# Patient Record
Sex: FEMALE | Race: ASIAN | NOT HISPANIC OR LATINO | Employment: FULL TIME | URBAN - METROPOLITAN AREA
[De-identification: names, ages, dates, MRNs, and addresses within clinical notes are randomized per-mention and may not be internally consistent; named-entity substitution may affect disease eponyms.]

---

## 2017-08-31 ENCOUNTER — OFFICE VISIT (OUTPATIENT)
Dept: RETAIL CLINIC | Facility: CLINIC | Age: 37
End: 2017-08-31

## 2017-08-31 VITALS
HEART RATE: 83 BPM | RESPIRATION RATE: 18 BRPM | TEMPERATURE: 98.7 F | DIASTOLIC BLOOD PRESSURE: 82 MMHG | OXYGEN SATURATION: 98 % | SYSTOLIC BLOOD PRESSURE: 102 MMHG

## 2017-08-31 DIAGNOSIS — N30.91 CYSTITIS WITH HEMATURIA: Primary | ICD-10-CM

## 2017-08-31 LAB
BILIRUB BLD-MCNC: NEGATIVE MG/DL
CLARITY, POC: ABNORMAL
COLOR UR: YELLOW
GLUCOSE UR STRIP-MCNC: NEGATIVE MG/DL
KETONES UR QL: NEGATIVE
LEUKOCYTE EST, POC: ABNORMAL
NITRITE UR-MCNC: POSITIVE MG/ML
PH UR: 6 [PH] (ref 5–8)
PROT UR STRIP-MCNC: ABNORMAL MG/DL
RBC # UR STRIP: ABNORMAL /UL
SP GR UR: 1.03 (ref 1–1.03)
UROBILINOGEN UR QL: NORMAL

## 2017-08-31 PROCEDURE — 81003 URINALYSIS AUTO W/O SCOPE: CPT | Performed by: NURSE PRACTITIONER

## 2017-08-31 PROCEDURE — 99203 OFFICE O/P NEW LOW 30 MIN: CPT | Performed by: NURSE PRACTITIONER

## 2017-08-31 RX ORDER — SULFAMETHOXAZOLE AND TRIMETHOPRIM 800; 160 MG/1; MG/1
1 TABLET ORAL 2 TIMES DAILY
Qty: 14 TABLET | Refills: 0 | Status: SHIPPED | OUTPATIENT
Start: 2017-08-31 | End: 2017-09-07

## 2019-06-21 ENCOUNTER — OFFICE VISIT (OUTPATIENT)
Dept: RETAIL CLINIC | Facility: CLINIC | Age: 39
End: 2019-06-21

## 2019-06-21 VITALS
DIASTOLIC BLOOD PRESSURE: 72 MMHG | OXYGEN SATURATION: 98 % | RESPIRATION RATE: 18 BRPM | TEMPERATURE: 98.2 F | HEART RATE: 65 BPM | SYSTOLIC BLOOD PRESSURE: 100 MMHG

## 2019-06-21 DIAGNOSIS — R30.0 DYSURIA: Primary | ICD-10-CM

## 2019-06-21 LAB
BILIRUB BLD-MCNC: NEGATIVE MG/DL
CLARITY, POC: CLEAR
COLOR UR: YELLOW
GLUCOSE UR STRIP-MCNC: NEGATIVE MG/DL
KETONES UR QL: NEGATIVE
LEUKOCYTE EST, POC: NEGATIVE
NITRITE UR-MCNC: NEGATIVE MG/ML
PH UR: 6 [PH] (ref 5–8)
PROT UR STRIP-MCNC: NEGATIVE MG/DL
RBC # UR STRIP: NEGATIVE /UL
SP GR UR: 1.02 (ref 1–1.03)
UROBILINOGEN UR QL: NORMAL

## 2019-06-21 PROCEDURE — 81003 URINALYSIS AUTO W/O SCOPE: CPT | Performed by: NURSE PRACTITIONER

## 2019-06-21 PROCEDURE — 99213 OFFICE O/P EST LOW 20 MIN: CPT | Performed by: NURSE PRACTITIONER

## 2019-06-21 NOTE — PROGRESS NOTES
Subjective   Patient ID: Ceci Nolasco is a 39 y.o. female presents with   Chief Complaint   Patient presents with   • Urinary Tract Infection       Urinary Tract Infection    This is a new problem. The current episode started in the past 7 days. The problem has been gradually improving. The quality of the pain is described as aching. The pain is mild. There has been no fever. She is sexually active. There is a history of pyelonephritis. Associated symptoms include frequency and urgency. Pertinent negatives include no flank pain or hematuria. Treatments tried: cranberry and probiotics. Her past medical history is significant for recurrent UTIs. There is no history of kidney stones or a single kidney.       No Known Allergies    The following portions of the patient's history were reviewed and updated as appropriate: allergies, current medications, past family history, past medical history, past social history, past surgical history and problem list.      Review of Systems   Constitutional: Negative.    Cardiovascular: Negative.    Gastrointestinal: Negative.    Genitourinary: Positive for dysuria, frequency and urgency. Negative for decreased urine volume, difficulty urinating, dyspareunia, enuresis, flank pain, genital sores, hematuria, menstrual problem, pelvic pain, vaginal bleeding, vaginal discharge and vaginal pain.       Objective     Vitals:    06/21/19 1238   BP: 100/72   Pulse: 65   Resp: 18   Temp: 98.2 °F (36.8 °C)   SpO2: 98%         Physical Exam   Constitutional: She is oriented to person, place, and time. She appears well-developed and well-nourished. She does not appear ill. No distress.   HENT:   Head: Normocephalic.   Right Ear: Hearing and external ear normal.   Left Ear: Hearing and external ear normal.   Eyes: Conjunctivae are normal.   Sclera white.   Neck: No tracheal deviation present.   Cardiovascular: Normal rate, regular rhythm, S1 normal, S2 normal and normal heart sounds.    Pulmonary/Chest: Effort normal and breath sounds normal. No accessory muscle usage. No respiratory distress.   Abdominal: Soft. Bowel sounds are normal. She exhibits no distension. There is no hepatosplenomegaly. There is tenderness in the right lower quadrant and left lower quadrant. There is no rigidity, no rebound, no guarding and no CVA tenderness. No hernia.   Lymphadenopathy:     She has no cervical adenopathy.   Neurological: She is alert and oriented to person, place, and time.   Skin: Skin is warm and dry.   Vitals reviewed.    Ref Range & Units 12:53    Color Yellow, Straw, Dark Yellow, Radha Yellow    Clarity, UA Clear Clear    Specific Gravity  1.005 - 1.030 1.020    pH, Urine 5.0 - 8.0 6.0    Leukocytes Negative Negative    Nitrite, UA Negative Negative    Protein, POC Negative mg/dL Negative    Glucose, UA Negative, 1000 mg/dL (3+) mg/dL Negative    Ketones, UA Negative Negative    Urobilinogen, UA Normal Normal    Bilirubin Negative Negative    Blood, UA Negative Negative    Resulting Agency  [facility]         Specimen Collected: 06/21/19 12:53 Last Resulted: 06/21/19 12:53            Ceci was seen today for urinary tract infection.    Diagnoses and all orders for this visit:    Dysuria  -     POC Urinalysis Dipstick, Automated        Patient understands possible side effects of all medications ordered. Follow-up with Primary Care Physician in 48-72 hours if these symptoms worsen or fail to improve as anticipated. Patient verbalizes understanding.  Dysuria  Dysuria is pain or discomfort while urinating. The pain or discomfort may be felt in the tube that carries urine out of the bladder (urethra) or in the surrounding tissue of the genitals. The pain may also be felt in the groin area, lower abdomen, and lower back. You may have to urinate frequently or have the sudden feeling that you have to urinate (urgency). Dysuria can affect both men and women, but is more common in  women.  Dysuria can be caused by many different things, including:  · Urinary tract infection in women.  · Infection of the kidney or bladder.  · Kidney stones or bladder stones.  · Certain sexually transmitted infections (STIs), such as chlamydia.  · Dehydration.  · Inflammation of the vagina.  · Use of certain medicines.  · Use of certain soaps or scented products that cause irritation.    Follow these instructions at home:  Watch your dysuria for any changes. The following actions may help to reduce any discomfort you are feeling:  · Drink enough fluid to keep your urine clear or pale yellow.  · Empty your bladder often. Avoid holding urine for long periods of time.  · After a bowel movement or urination, women should cleanse from front to back, using each tissue only once.  · Empty your bladder after sexual intercourse.  · Take medicines only as directed by your health care provider.  · If you were prescribed an antibiotic medicine, finish it all even if you start to feel better.  · Avoid caffeine, tea, and alcohol. They can irritate the bladder and make dysuria worse. In men, alcohol may irritate the prostate.  · Keep all follow-up visits as directed by your health care provider. This is important.  · If you had any tests done to find the cause of dysuria, it is your responsibility to obtain your test results. Ask the lab or department performing the test when and how you will get your results. Talk with your health care provider if you have any questions about your results.    Contact a health care provider if:  · You develop pain in your back or sides.  · You have a fever.  · You have nausea or vomiting.  · You have blood in your urine.  · You are not urinating as often as you usually do.  Get help right away if:  · You pain is severe and not relieved with medicines.  · You are unable to hold down any fluids.  · You or someone else notices a change in your mental function.  · You have a rapid heartbeat at  rest.  · You have shaking or chills.  · You feel extremely weak.  This information is not intended to replace advice given to you by your health care provider. Make sure you discuss any questions you have with your health care provider.  Document Released: 09/15/2005 Document Revised: 05/25/2017 Document Reviewed: 08/13/2015  [employer] Interactive Patient Education © 2018 [employer]

## 2019-06-21 NOTE — PATIENT INSTRUCTIONS
Dysuria  Dysuria is pain or discomfort while urinating. The pain or discomfort may be felt in the tube that carries urine out of the bladder (urethra) or in the surrounding tissue of the genitals. The pain may also be felt in the groin area, lower abdomen, and lower back. You may have to urinate frequently or have the sudden feeling that you have to urinate (urgency). Dysuria can affect both men and women, but is more common in women.  Dysuria can be caused by many different things, including:  · Urinary tract infection in women.  · Infection of the kidney or bladder.  · Kidney stones or bladder stones.  · Certain sexually transmitted infections (STIs), such as chlamydia.  · Dehydration.  · Inflammation of the vagina.  · Use of certain medicines.  · Use of certain soaps or scented products that cause irritation.    Follow these instructions at home:  Watch your dysuria for any changes. The following actions may help to reduce any discomfort you are feeling:  · Drink enough fluid to keep your urine clear or pale yellow.  · Empty your bladder often. Avoid holding urine for long periods of time.  · After a bowel movement or urination, women should cleanse from front to back, using each tissue only once.  · Empty your bladder after sexual intercourse.  · Take medicines only as directed by your health care provider.  · If you were prescribed an antibiotic medicine, finish it all even if you start to feel better.  · Avoid caffeine, tea, and alcohol. They can irritate the bladder and make dysuria worse. In men, alcohol may irritate the prostate.  · Keep all follow-up visits as directed by your health care provider. This is important.  · If you had any tests done to find the cause of dysuria, it is your responsibility to obtain your test results. Ask the lab or department performing the test when and how you will get your results. Talk with your health care provider if you have any questions about your results.    Contact a  health care provider if:  · You develop pain in your back or sides.  · You have a fever.  · You have nausea or vomiting.  · You have blood in your urine.  · You are not urinating as often as you usually do.  Get help right away if:  · You pain is severe and not relieved with medicines.  · You are unable to hold down any fluids.  · You or someone else notices a change in your mental function.  · You have a rapid heartbeat at rest.  · You have shaking or chills.  · You feel extremely weak.  This information is not intended to replace advice given to you by your health care provider. Make sure you discuss any questions you have with your health care provider.  Document Released: 09/15/2005 Document Revised: 05/25/2017 Document Reviewed: 08/13/2015  [employer] Interactive Patient Education © 2018 [employer]

## 2022-02-24 ENCOUNTER — APPOINTMENT (OUTPATIENT)
Dept: WOMENS IMAGING | Facility: HOSPITAL | Age: 42
End: 2022-02-24

## 2022-02-24 PROCEDURE — 77067 SCR MAMMO BI INCL CAD: CPT | Performed by: RADIOLOGY

## 2022-02-24 PROCEDURE — 77063 BREAST TOMOSYNTHESIS BI: CPT | Performed by: RADIOLOGY

## 2022-03-21 ENCOUNTER — APPOINTMENT (OUTPATIENT)
Dept: WOMENS IMAGING | Facility: HOSPITAL | Age: 42
End: 2022-03-21

## 2022-03-21 PROCEDURE — 76642 ULTRASOUND BREAST LIMITED: CPT | Performed by: RADIOLOGY

## 2022-03-21 PROCEDURE — 77061 BREAST TOMOSYNTHESIS UNI: CPT | Performed by: RADIOLOGY

## 2022-03-21 PROCEDURE — G0279 TOMOSYNTHESIS, MAMMO: HCPCS | Performed by: RADIOLOGY

## 2022-03-21 PROCEDURE — 77065 DX MAMMO INCL CAD UNI: CPT | Performed by: RADIOLOGY

## 2022-09-23 ENCOUNTER — APPOINTMENT (OUTPATIENT)
Dept: WOMENS IMAGING | Facility: HOSPITAL | Age: 42
End: 2022-09-23

## 2022-09-23 PROCEDURE — 77061 BREAST TOMOSYNTHESIS UNI: CPT | Performed by: RADIOLOGY

## 2022-09-23 PROCEDURE — 76642 ULTRASOUND BREAST LIMITED: CPT | Performed by: RADIOLOGY

## 2022-09-23 PROCEDURE — 77065 DX MAMMO INCL CAD UNI: CPT | Performed by: RADIOLOGY

## 2022-09-23 PROCEDURE — G0279 TOMOSYNTHESIS, MAMMO: HCPCS | Performed by: RADIOLOGY

## 2023-03-28 ENCOUNTER — APPOINTMENT (OUTPATIENT)
Dept: WOMENS IMAGING | Facility: HOSPITAL | Age: 43
End: 2023-03-28
Payer: COMMERCIAL

## 2023-03-28 PROCEDURE — 77066 DX MAMMO INCL CAD BI: CPT | Performed by: RADIOLOGY

## 2023-03-28 PROCEDURE — 77062 BREAST TOMOSYNTHESIS BI: CPT | Performed by: RADIOLOGY

## 2023-03-28 PROCEDURE — 76642 ULTRASOUND BREAST LIMITED: CPT | Performed by: RADIOLOGY

## 2023-03-28 PROCEDURE — G0279 TOMOSYNTHESIS, MAMMO: HCPCS | Performed by: RADIOLOGY

## 2023-08-09 ENCOUNTER — PRE-ADMISSION TESTING (OUTPATIENT)
Dept: PREADMISSION TESTING | Facility: HOSPITAL | Age: 43
End: 2023-08-09
Payer: COMMERCIAL

## 2023-08-09 VITALS
DIASTOLIC BLOOD PRESSURE: 77 MMHG | HEIGHT: 60 IN | RESPIRATION RATE: 16 BRPM | BODY MASS INDEX: 28.76 KG/M2 | HEART RATE: 58 BPM | OXYGEN SATURATION: 100 % | SYSTOLIC BLOOD PRESSURE: 112 MMHG | TEMPERATURE: 98.3 F | WEIGHT: 146.5 LBS

## 2023-08-09 LAB
ANION GAP SERPL CALCULATED.3IONS-SCNC: 9 MMOL/L (ref 5–15)
BUN SERPL-MCNC: 15 MG/DL (ref 6–20)
BUN/CREAT SERPL: 20.8 (ref 7–25)
CALCIUM SPEC-SCNC: 10.8 MG/DL (ref 8.6–10.5)
CHLORIDE SERPL-SCNC: 104 MMOL/L (ref 98–107)
CO2 SERPL-SCNC: 24 MMOL/L (ref 22–29)
CREAT SERPL-MCNC: 0.72 MG/DL (ref 0.57–1)
DEPRECATED RDW RBC AUTO: 37.9 FL (ref 37–54)
EGFRCR SERPLBLD CKD-EPI 2021: 107.2 ML/MIN/1.73
ERYTHROCYTE [DISTWIDTH] IN BLOOD BY AUTOMATED COUNT: 12.5 % (ref 12.3–15.4)
GLUCOSE SERPL-MCNC: 97 MG/DL (ref 65–99)
HCT VFR BLD AUTO: 37.7 % (ref 34–46.6)
HGB BLD-MCNC: 12.1 G/DL (ref 12–15.9)
MCH RBC QN AUTO: 26.9 PG (ref 26.6–33)
MCHC RBC AUTO-ENTMCNC: 32.1 G/DL (ref 31.5–35.7)
MCV RBC AUTO: 83.8 FL (ref 79–97)
PLATELET # BLD AUTO: 260 10*3/MM3 (ref 140–450)
PMV BLD AUTO: 10 FL (ref 6–12)
POTASSIUM SERPL-SCNC: 4.5 MMOL/L (ref 3.5–5.2)
QT INTERVAL: 391 MS
RBC # BLD AUTO: 4.5 10*6/MM3 (ref 3.77–5.28)
SODIUM SERPL-SCNC: 137 MMOL/L (ref 136–145)
WBC NRBC COR # BLD: 5.18 10*3/MM3 (ref 3.4–10.8)

## 2023-08-09 PROCEDURE — 80048 BASIC METABOLIC PNL TOTAL CA: CPT

## 2023-08-09 PROCEDURE — 93010 ELECTROCARDIOGRAM REPORT: CPT | Performed by: INTERNAL MEDICINE

## 2023-08-09 PROCEDURE — 93005 ELECTROCARDIOGRAM TRACING: CPT

## 2023-08-09 PROCEDURE — 36415 COLL VENOUS BLD VENIPUNCTURE: CPT

## 2023-08-09 PROCEDURE — 85027 COMPLETE CBC AUTOMATED: CPT

## 2023-08-09 RX ORDER — SPIRONOLACTONE 50 MG/1
50 TABLET, FILM COATED ORAL DAILY
COMMUNITY

## 2023-08-09 NOTE — DISCHARGE INSTRUCTIONS
Take the following medications the morning of surgery:    NONE    ARRIVE AT 0530.    If you are on prescription narcotic pain medication to control your pain you may also take that medication the morning of surgery.    General Instructions:  Do not eat solid food after midnight the night before surgery.  You may drink clear liquids day of surgery but must stop at least one hour before your hospital arrival time.  It is beneficial for you to have a clear drink that contains carbohydrates the day of surgery.  We suggest a 12 to 20 ounce bottle of Gatorade or Powerade for non-diabetic patients or a 12 to 20 ounce bottle of G2 or Powerade Zero for diabetic patients. (Pediatric patients, are not advised to drink a 12 to 20 ounce carbohydrate drink)    Clear liquids are liquids you can see through.  Nothing red in color.     Plain water                               Sports drinks  Sodas                                   Gelatin (Jell-O)  Fruit juices without pulp such as white grape juice and apple juice  Popsicles that contain no fruit or yogurt  Tea or coffee (no cream or milk added)  Gatorade / Powerade  G2 / Powerade Zero    Infants may have breast milk up to four hours before surgery.  Infants drinking formula may drink formula up to six hours before surgery.   Patients who avoid smoking, chewing tobacco and alcohol for 4 weeks prior to surgery have a reduced risk of post-operative complications.  Quit smoking as many days before surgery as you can.  Do not smoke, use chewing tobacco or drink alcohol the day of surgery.   If applicable bring your C-PAP/ BI-PAP machine in with you to preop day of surgery.  Bring any papers given to you in the doctor's office.  Wear clean comfortable clothes.  Do not wear contact lenses, false eyelashes or make-up.  Bring a case for your glasses.   Bring crutches or walker if applicable.  Remove all piercings.  Leave jewelry and any other valuables at home.  Hair extensions with metal  clips must be removed prior to surgery.  The Pre-Admission Testing nurse will instruct you to bring medications if unable to obtain an accurate list in Pre-Admission Testing.        If you were given a blood bank ID arm band remember to bring it with you the day of surgery.    Preventing a Surgical Site Infection:  For 2 to 3 days before surgery, avoid shaving with a razor because the razor can irritate skin and make it easier to develop an infection.    Any areas of open skin can increase the risk of a post-operative wound infection by allowing bacteria to enter and travel throughout the body.  Notify your surgeon if you have any skin wounds / rashes even if it is not near the expected surgical site.  The area will need assessed to determine if surgery should be delayed until it is healed.  The night prior to surgery shower using a fresh bar of anti-bacterial soap (such as Dial) and clean washcloth.  Sleep in a clean bed with clean clothing.  Do not allow pets to sleep with you.  Shower on the morning of surgery using a fresh bar of anti-bacterial soap (such as Dial) and clean washcloth.  Dry with a clean towel and dress in clean clothing.  Ask your surgeon if you will be receiving antibiotics prior to surgery.  Make sure you, your family, and all healthcare providers clean their hands with soap and water or an alcohol based hand  before caring for you or your wound.    Day of surgery:  Your arrival time is approximately two hours before your scheduled surgery time.  Upon arrival, a Pre-op nurse and Anesthesiologist will review your health history, obtain vital signs, and answer questions you may have.  The only belongings needed at this time will be a list of your home medications and if applicable your C-PAP/BI-PAP machine.  A Pre-op nurse will start an IV and you may receive medication in preparation for surgery, including something to help you relax.     Please be aware that surgery does come with  discomfort.  We want to make every effort to control your discomfort so please discuss any uncontrolled symptoms with your nurse.   Your doctor will most likely have prescribed pain medications.      If you are going home after surgery you will receive individualized written care instructions before being discharged.  A responsible adult must drive you to and from the hospital on the day of your surgery and stay with you for 24 hours.  Discharge prescriptions can be filled by the hospital pharmacy during regular pharmacy hours.  If you are having surgery late in the day/evening your prescription may be e-prescribed to your pharmacy.  Please verify your pharmacy hours or chose a 24 hour pharmacy to avoid not having access to your prescription because your pharmacy has closed for the day.    If you are staying overnight following surgery, you will be transported to your hospital room following the recovery period.  [facility] has all private rooms.    If you have any questions please call Pre-Admission Testing at [phone].  Deductibles and co-payments are collected on the day of service. Please be prepared to pay the required co-pay, deductible or deposit on the day of service as defined by your plan.    Call your surgeon immediately if you experience any of the following symptoms:  Sore Throat  Shortness of Breath or difficulty breathing  Cough  Chills  Body soreness or muscle pain  Headache  Fever  New loss of taste or smell  Do not arrive for your surgery ill.  Your procedure will need to be rescheduled to another time.  You will need to call your physician before the day of surgery to avoid any unnecessary exposure to hospital staff as well as other patients.

## 2023-08-22 ENCOUNTER — HOSPITAL ENCOUNTER (INPATIENT)
Facility: HOSPITAL | Age: 43
LOS: 2 days | Discharge: HOME | DRG: 621 | End: 2023-08-24
Attending: SURGERY | Admitting: SURGERY

## 2023-08-22 ENCOUNTER — ANESTHESIA EVENT (OUTPATIENT)
Dept: PERIOP | Facility: HOSPITAL | Age: 43
DRG: 621 | End: 2023-08-22

## 2023-08-22 ENCOUNTER — ANESTHESIA (OUTPATIENT)
Dept: PERIOP | Facility: HOSPITAL | Age: 43
DRG: 621 | End: 2023-08-22

## 2023-08-22 DIAGNOSIS — G89.18 POSTOPERATIVE PAIN: ICD-10-CM

## 2023-08-22 DIAGNOSIS — E65 ABDOMINAL PANNICULUS: Primary | ICD-10-CM

## 2023-08-22 LAB
B-HCG UR QL: NEGATIVE
EXPIRATION DATE: NORMAL
INTERNAL NEGATIVE CONTROL: NEGATIVE
INTERNAL POSITIVE CONTROL: POSITIVE
Lab: NORMAL

## 2023-08-22 PROCEDURE — 25010000002 SUGAMMADEX 200 MG/2ML SOLUTION: Performed by: NURSE ANESTHETIST, CERTIFIED REGISTERED

## 2023-08-22 PROCEDURE — 25010000002 EPINEPHRINE PER 0.1 MG: Performed by: SURGERY

## 2023-08-22 PROCEDURE — 25010000002 CEFAZOLIN PER 500 MG: Performed by: SURGERY

## 2023-08-22 PROCEDURE — 0WQF0ZZ REPAIR ABDOMINAL WALL, OPEN APPROACH: ICD-10-PCS | Performed by: SURGERY

## 2023-08-22 PROCEDURE — 25010000002 CLINDAMYCIN PER 300 MG: Performed by: SURGERY

## 2023-08-22 PROCEDURE — 25010000002 FENTANYL CITRATE (PF) 50 MCG/ML SOLUTION: Performed by: NURSE ANESTHETIST, CERTIFIED REGISTERED

## 2023-08-22 PROCEDURE — 25010000002 METHYLPREDNISOLONE PER 125 MG: Performed by: SURGERY

## 2023-08-22 PROCEDURE — 25010000002 PROPOFOL 10 MG/ML EMULSION: Performed by: NURSE ANESTHETIST, CERTIFIED REGISTERED

## 2023-08-22 PROCEDURE — 25010000002 LABETALOL 5 MG/ML SOLUTION: Performed by: NURSE ANESTHETIST, CERTIFIED REGISTERED

## 2023-08-22 PROCEDURE — 25010000002 PHENYLEPHRINE 10 MG/ML SOLUTION: Performed by: NURSE ANESTHETIST, CERTIFIED REGISTERED

## 2023-08-22 PROCEDURE — 81025 URINE PREGNANCY TEST: CPT | Performed by: ANESTHESIOLOGY

## 2023-08-22 PROCEDURE — 25010000002 MIDAZOLAM PER 1 MG: Performed by: ANESTHESIOLOGY

## 2023-08-22 PROCEDURE — 25010000002 ONDANSETRON PER 1 MG: Performed by: NURSE ANESTHETIST, CERTIFIED REGISTERED

## 2023-08-22 PROCEDURE — 0J083ZZ ALTERATION OF ABDOMEN SUBCUTANEOUS TISSUE AND FASCIA, PERCUTANEOUS APPROACH: ICD-10-PCS | Performed by: SURGERY

## 2023-08-22 PROCEDURE — 25010000002 HYDROMORPHONE PER 4 MG: Performed by: NURSE ANESTHETIST, CERTIFIED REGISTERED

## 2023-08-22 PROCEDURE — 25010000002 ROPIVACAINE PER 1 MG: Performed by: SURGERY

## 2023-08-22 RX ORDER — SODIUM CHLORIDE 0.9 % (FLUSH) 0.9 %
3 SYRINGE (ML) INJECTION EVERY 12 HOURS SCHEDULED
Status: DISCONTINUED | OUTPATIENT
Start: 2023-08-22 | End: 2023-08-22 | Stop reason: HOSPADM

## 2023-08-22 RX ORDER — TEMAZEPAM 7.5 MG/1
7.5 CAPSULE ORAL NIGHTLY PRN
Status: DISCONTINUED | OUTPATIENT
Start: 2023-08-22 | End: 2023-08-24 | Stop reason: HOSPADM

## 2023-08-22 RX ORDER — ONDANSETRON 2 MG/ML
INJECTION INTRAMUSCULAR; INTRAVENOUS AS NEEDED
Status: DISCONTINUED | OUTPATIENT
Start: 2023-08-22 | End: 2023-08-22 | Stop reason: SURG

## 2023-08-22 RX ORDER — POLYETHYLENE GLYCOL 3350 17 G/17G
17 POWDER, FOR SOLUTION ORAL DAILY
Status: DISCONTINUED | OUTPATIENT
Start: 2023-08-22 | End: 2023-08-24 | Stop reason: HOSPADM

## 2023-08-22 RX ORDER — OXYCODONE AND ACETAMINOPHEN 7.5; 325 MG/1; MG/1
1 TABLET ORAL EVERY 4 HOURS PRN
Status: DISCONTINUED | OUTPATIENT
Start: 2023-08-22 | End: 2023-08-22 | Stop reason: HOSPADM

## 2023-08-22 RX ORDER — SODIUM CHLORIDE, SODIUM LACTATE, POTASSIUM CHLORIDE, CALCIUM CHLORIDE 600; 310; 30; 20 MG/100ML; MG/100ML; MG/100ML; MG/100ML
50 INJECTION, SOLUTION INTRAVENOUS CONTINUOUS
Status: DISCONTINUED | OUTPATIENT
Start: 2023-08-22 | End: 2023-08-24 | Stop reason: HOSPADM

## 2023-08-22 RX ORDER — METHOCARBAMOL 750 MG/1
750 TABLET, FILM COATED ORAL EVERY 8 HOURS PRN
Status: DISCONTINUED | OUTPATIENT
Start: 2023-08-22 | End: 2023-08-24 | Stop reason: HOSPADM

## 2023-08-22 RX ORDER — MAGNESIUM HYDROXIDE 1200 MG/15ML
LIQUID ORAL AS NEEDED
Status: DISCONTINUED | OUTPATIENT
Start: 2023-08-22 | End: 2023-08-22 | Stop reason: HOSPADM

## 2023-08-22 RX ORDER — ONDANSETRON 4 MG/1
4 TABLET, FILM COATED ORAL EVERY 6 HOURS PRN
Status: DISCONTINUED | OUTPATIENT
Start: 2023-08-22 | End: 2023-08-24 | Stop reason: HOSPADM

## 2023-08-22 RX ORDER — SCOLOPAMINE TRANSDERMAL SYSTEM 1 MG/1
1 PATCH, EXTENDED RELEASE TRANSDERMAL ONCE
Status: DISCONTINUED | OUTPATIENT
Start: 2023-08-22 | End: 2023-08-22

## 2023-08-22 RX ORDER — PHENYLEPHRINE HYDROCHLORIDE 10 MG/ML
INJECTION INTRAVENOUS AS NEEDED
Status: DISCONTINUED | OUTPATIENT
Start: 2023-08-22 | End: 2023-08-22 | Stop reason: SURG

## 2023-08-22 RX ORDER — LABETALOL HYDROCHLORIDE 5 MG/ML
INJECTION, SOLUTION INTRAVENOUS AS NEEDED
Status: DISCONTINUED | OUTPATIENT
Start: 2023-08-22 | End: 2023-08-22 | Stop reason: SURG

## 2023-08-22 RX ORDER — FLUMAZENIL 0.1 MG/ML
0.2 INJECTION INTRAVENOUS AS NEEDED
Status: DISCONTINUED | OUTPATIENT
Start: 2023-08-22 | End: 2023-08-22 | Stop reason: HOSPADM

## 2023-08-22 RX ORDER — CELECOXIB 200 MG/1
400 CAPSULE ORAL ONCE
Status: COMPLETED | OUTPATIENT
Start: 2023-08-22 | End: 2023-08-22

## 2023-08-22 RX ORDER — HYDROCODONE BITARTRATE AND ACETAMINOPHEN 7.5; 325 MG/1; MG/1
1 TABLET ORAL ONCE AS NEEDED
Status: DISCONTINUED | OUTPATIENT
Start: 2023-08-22 | End: 2023-08-22 | Stop reason: HOSPADM

## 2023-08-22 RX ORDER — OXYCODONE AND ACETAMINOPHEN 10; 325 MG/1; MG/1
1 TABLET ORAL EVERY 4 HOURS PRN
Status: DISCONTINUED | OUTPATIENT
Start: 2023-08-22 | End: 2023-08-24 | Stop reason: HOSPADM

## 2023-08-22 RX ORDER — MAGNESIUM GLUCONATE 27 MG(500)
200 TABLET ORAL ONCE
COMMUNITY

## 2023-08-22 RX ORDER — HYDROXYZINE PAMOATE 50 MG/1
50 CAPSULE ORAL EVERY 4 HOURS PRN
Status: DISCONTINUED | OUTPATIENT
Start: 2023-08-22 | End: 2023-08-24 | Stop reason: HOSPADM

## 2023-08-22 RX ORDER — LIDOCAINE HYDROCHLORIDE 20 MG/ML
INJECTION, SOLUTION INFILTRATION; PERINEURAL AS NEEDED
Status: DISCONTINUED | OUTPATIENT
Start: 2023-08-22 | End: 2023-08-22 | Stop reason: SURG

## 2023-08-22 RX ORDER — DOCUSATE SODIUM 100 MG/1
100 CAPSULE, LIQUID FILLED ORAL 2 TIMES DAILY
Status: DISCONTINUED | OUTPATIENT
Start: 2023-08-22 | End: 2023-08-24 | Stop reason: HOSPADM

## 2023-08-22 RX ORDER — SODIUM CHLORIDE, SODIUM LACTATE, POTASSIUM CHLORIDE, CALCIUM CHLORIDE 600; 310; 30; 20 MG/100ML; MG/100ML; MG/100ML; MG/100ML
100 INJECTION, SOLUTION INTRAVENOUS CONTINUOUS
Status: DISCONTINUED | OUTPATIENT
Start: 2023-08-22 | End: 2023-08-24 | Stop reason: HOSPADM

## 2023-08-22 RX ORDER — HYDROMORPHONE HYDROCHLORIDE 1 MG/ML
0.5 INJECTION, SOLUTION INTRAMUSCULAR; INTRAVENOUS; SUBCUTANEOUS
Status: DISCONTINUED | OUTPATIENT
Start: 2023-08-22 | End: 2023-08-22 | Stop reason: HOSPADM

## 2023-08-22 RX ORDER — SODIUM CHLORIDE 0.9 % (FLUSH) 0.9 %
3-10 SYRINGE (ML) INJECTION AS NEEDED
Status: DISCONTINUED | OUTPATIENT
Start: 2023-08-22 | End: 2023-08-22 | Stop reason: HOSPADM

## 2023-08-22 RX ORDER — FAMOTIDINE 10 MG/ML
20 INJECTION, SOLUTION INTRAVENOUS ONCE
Status: COMPLETED | OUTPATIENT
Start: 2023-08-22 | End: 2023-08-22

## 2023-08-22 RX ORDER — FENTANYL CITRATE 50 UG/ML
50 INJECTION, SOLUTION INTRAMUSCULAR; INTRAVENOUS ONCE AS NEEDED
Status: DISCONTINUED | OUTPATIENT
Start: 2023-08-22 | End: 2023-08-22 | Stop reason: HOSPADM

## 2023-08-22 RX ORDER — HYDRALAZINE HYDROCHLORIDE 20 MG/ML
5 INJECTION INTRAMUSCULAR; INTRAVENOUS
Status: DISCONTINUED | OUTPATIENT
Start: 2023-08-22 | End: 2023-08-22 | Stop reason: HOSPADM

## 2023-08-22 RX ORDER — ONDANSETRON 2 MG/ML
4 INJECTION INTRAMUSCULAR; INTRAVENOUS EVERY 6 HOURS PRN
Status: DISCONTINUED | OUTPATIENT
Start: 2023-08-22 | End: 2023-08-24 | Stop reason: HOSPADM

## 2023-08-22 RX ORDER — ROCURONIUM BROMIDE 10 MG/ML
INJECTION, SOLUTION INTRAVENOUS AS NEEDED
Status: DISCONTINUED | OUTPATIENT
Start: 2023-08-22 | End: 2023-08-22 | Stop reason: SURG

## 2023-08-22 RX ORDER — CYCLOBENZAPRINE HCL 10 MG
10 TABLET ORAL ONCE
Status: COMPLETED | OUTPATIENT
Start: 2023-08-22 | End: 2023-08-22

## 2023-08-22 RX ORDER — NALOXONE HCL 0.4 MG/ML
0.2 VIAL (ML) INJECTION AS NEEDED
Status: DISCONTINUED | OUTPATIENT
Start: 2023-08-22 | End: 2023-08-22 | Stop reason: HOSPADM

## 2023-08-22 RX ORDER — FENTANYL CITRATE 50 UG/ML
INJECTION, SOLUTION INTRAMUSCULAR; INTRAVENOUS AS NEEDED
Status: DISCONTINUED | OUTPATIENT
Start: 2023-08-22 | End: 2023-08-22 | Stop reason: SURG

## 2023-08-22 RX ORDER — PROPOFOL 10 MG/ML
VIAL (ML) INTRAVENOUS AS NEEDED
Status: DISCONTINUED | OUTPATIENT
Start: 2023-08-22 | End: 2023-08-22 | Stop reason: SURG

## 2023-08-22 RX ORDER — IPRATROPIUM BROMIDE AND ALBUTEROL SULFATE 2.5; .5 MG/3ML; MG/3ML
3 SOLUTION RESPIRATORY (INHALATION) ONCE AS NEEDED
Status: DISCONTINUED | OUTPATIENT
Start: 2023-08-22 | End: 2023-08-22 | Stop reason: HOSPADM

## 2023-08-22 RX ORDER — CLINDAMYCIN PHOSPHATE 900 MG/50ML
900 INJECTION, SOLUTION INTRAVENOUS EVERY 8 HOURS
Status: COMPLETED | OUTPATIENT
Start: 2023-08-22 | End: 2023-08-23

## 2023-08-22 RX ORDER — HYDROMORPHONE HYDROCHLORIDE 1 MG/ML
0.25 INJECTION, SOLUTION INTRAMUSCULAR; INTRAVENOUS; SUBCUTANEOUS
Status: DISCONTINUED | OUTPATIENT
Start: 2023-08-22 | End: 2023-08-24 | Stop reason: HOSPADM

## 2023-08-22 RX ORDER — SPIRONOLACTONE 50 MG/1
50 TABLET, FILM COATED ORAL DAILY
Status: DISCONTINUED | OUTPATIENT
Start: 2023-08-22 | End: 2023-08-24 | Stop reason: HOSPADM

## 2023-08-22 RX ORDER — DROPERIDOL 2.5 MG/ML
0.62 INJECTION, SOLUTION INTRAMUSCULAR; INTRAVENOUS
Status: DISCONTINUED | OUTPATIENT
Start: 2023-08-22 | End: 2023-08-22 | Stop reason: HOSPADM

## 2023-08-22 RX ORDER — EPHEDRINE SULFATE 50 MG/ML
5 INJECTION, SOLUTION INTRAVENOUS ONCE AS NEEDED
Status: DISCONTINUED | OUTPATIENT
Start: 2023-08-22 | End: 2023-08-22 | Stop reason: HOSPADM

## 2023-08-22 RX ORDER — NALOXONE HCL 0.4 MG/ML
0.1 VIAL (ML) INJECTION
Status: DISCONTINUED | OUTPATIENT
Start: 2023-08-22 | End: 2023-08-24 | Stop reason: HOSPADM

## 2023-08-22 RX ORDER — ROPIVACAINE HYDROCHLORIDE 5 MG/ML
INJECTION, SOLUTION EPIDURAL; INFILTRATION; PERINEURAL AS NEEDED
Status: DISCONTINUED | OUTPATIENT
Start: 2023-08-22 | End: 2023-08-22 | Stop reason: HOSPADM

## 2023-08-22 RX ORDER — FENTANYL CITRATE 50 UG/ML
50 INJECTION, SOLUTION INTRAMUSCULAR; INTRAVENOUS
Status: DISCONTINUED | OUTPATIENT
Start: 2023-08-22 | End: 2023-08-22 | Stop reason: HOSPADM

## 2023-08-22 RX ORDER — DIPHENHYDRAMINE HYDROCHLORIDE 50 MG/ML
12.5 INJECTION INTRAMUSCULAR; INTRAVENOUS
Status: DISCONTINUED | OUTPATIENT
Start: 2023-08-22 | End: 2023-08-22 | Stop reason: HOSPADM

## 2023-08-22 RX ORDER — CELECOXIB 200 MG/1
200 CAPSULE ORAL ONCE
Status: COMPLETED | OUTPATIENT
Start: 2023-08-22 | End: 2023-08-22

## 2023-08-22 RX ORDER — CLINDAMYCIN PHOSPHATE 900 MG/50ML
900 INJECTION, SOLUTION INTRAVENOUS ONCE
Status: COMPLETED | OUTPATIENT
Start: 2023-08-22 | End: 2023-08-22

## 2023-08-22 RX ORDER — ACETAMINOPHEN 650 MG
TABLET, EXTENDED RELEASE ORAL AS NEEDED
Status: DISCONTINUED | OUTPATIENT
Start: 2023-08-22 | End: 2023-08-22 | Stop reason: HOSPADM

## 2023-08-22 RX ORDER — SODIUM CHLORIDE, SODIUM LACTATE, POTASSIUM CHLORIDE, CALCIUM CHLORIDE 600; 310; 30; 20 MG/100ML; MG/100ML; MG/100ML; MG/100ML
9 INJECTION, SOLUTION INTRAVENOUS CONTINUOUS
Status: DISCONTINUED | OUTPATIENT
Start: 2023-08-22 | End: 2023-08-24 | Stop reason: HOSPADM

## 2023-08-22 RX ORDER — OXYCODONE HYDROCHLORIDE AND ACETAMINOPHEN 5; 325 MG/1; MG/1
1 TABLET ORAL EVERY 4 HOURS PRN
Status: DISCONTINUED | OUTPATIENT
Start: 2023-08-22 | End: 2023-08-24 | Stop reason: HOSPADM

## 2023-08-22 RX ORDER — CELECOXIB 200 MG/1
400 CAPSULE ORAL DAILY
Status: DISCONTINUED | OUTPATIENT
Start: 2023-08-23 | End: 2023-08-24 | Stop reason: HOSPADM

## 2023-08-22 RX ORDER — HYDROXYZINE PAMOATE 50 MG/1
50 CAPSULE ORAL ONCE
Status: COMPLETED | OUTPATIENT
Start: 2023-08-22 | End: 2023-08-22

## 2023-08-22 RX ORDER — BISACODYL 10 MG
10 SUPPOSITORY, RECTAL RECTAL DAILY
Status: DISCONTINUED | OUTPATIENT
Start: 2023-08-22 | End: 2023-08-24 | Stop reason: HOSPADM

## 2023-08-22 RX ORDER — ACETAMINOPHEN 325 MG/1
975 TABLET ORAL ONCE
Status: COMPLETED | OUTPATIENT
Start: 2023-08-22 | End: 2023-08-22

## 2023-08-22 RX ORDER — LABETALOL HYDROCHLORIDE 5 MG/ML
5 INJECTION, SOLUTION INTRAVENOUS
Status: DISCONTINUED | OUTPATIENT
Start: 2023-08-22 | End: 2023-08-22 | Stop reason: HOSPADM

## 2023-08-22 RX ORDER — PROMETHAZINE HYDROCHLORIDE 25 MG/1
25 TABLET ORAL EVERY 6 HOURS PRN
Status: DISCONTINUED | OUTPATIENT
Start: 2023-08-22 | End: 2023-08-24 | Stop reason: HOSPADM

## 2023-08-22 RX ORDER — LIDOCAINE HYDROCHLORIDE 10 MG/ML
0.5 INJECTION, SOLUTION INFILTRATION; PERINEURAL ONCE AS NEEDED
Status: DISCONTINUED | OUTPATIENT
Start: 2023-08-22 | End: 2023-08-22 | Stop reason: HOSPADM

## 2023-08-22 RX ORDER — MIDAZOLAM HYDROCHLORIDE 1 MG/ML
1 INJECTION INTRAMUSCULAR; INTRAVENOUS
Status: COMPLETED | OUTPATIENT
Start: 2023-08-22 | End: 2023-08-22

## 2023-08-22 RX ORDER — ONDANSETRON 2 MG/ML
4 INJECTION INTRAMUSCULAR; INTRAVENOUS ONCE AS NEEDED
Status: DISCONTINUED | OUTPATIENT
Start: 2023-08-22 | End: 2023-08-22 | Stop reason: HOSPADM

## 2023-08-22 RX ORDER — CETIRIZINE HYDROCHLORIDE 10 MG/1
10 TABLET ORAL DAILY
Status: DISCONTINUED | OUTPATIENT
Start: 2023-08-22 | End: 2023-08-24 | Stop reason: HOSPADM

## 2023-08-22 RX ADMIN — ONDANSETRON 4 MG: 2 INJECTION INTRAMUSCULAR; INTRAVENOUS at 10:25

## 2023-08-22 RX ADMIN — ROCURONIUM BROMIDE 20 MG: 10 INJECTION, SOLUTION INTRAVENOUS at 09:26

## 2023-08-22 RX ADMIN — MUPIROCIN 1 APPLICATION: 20 OINTMENT TOPICAL at 21:03

## 2023-08-22 RX ADMIN — OXYCODONE HYDROCHLORIDE AND ACETAMINOPHEN 1 TABLET: 5; 325 TABLET ORAL at 13:54

## 2023-08-22 RX ADMIN — MUPIROCIN 1 APPLICATION: 20 OINTMENT TOPICAL at 07:04

## 2023-08-22 RX ADMIN — FENTANYL CITRATE 50 MCG: 50 INJECTION, SOLUTION INTRAMUSCULAR; INTRAVENOUS at 08:20

## 2023-08-22 RX ADMIN — METHOCARBAMOL TABLETS 750 MG: 750 TABLET, COATED ORAL at 18:02

## 2023-08-22 RX ADMIN — HYDROMORPHONE HYDROCHLORIDE 0.5 MG: 1 INJECTION, SOLUTION INTRAMUSCULAR; INTRAVENOUS; SUBCUTANEOUS at 11:34

## 2023-08-22 RX ADMIN — HYDROMORPHONE HYDROCHLORIDE 0.5 MG: 1 INJECTION, SOLUTION INTRAMUSCULAR; INTRAVENOUS; SUBCUTANEOUS at 11:06

## 2023-08-22 RX ADMIN — ACETAMINOPHEN 975 MG: 325 TABLET, FILM COATED ORAL at 06:38

## 2023-08-22 RX ADMIN — SODIUM CHLORIDE 900 MG: 9 INJECTION, SOLUTION INTRAVENOUS at 07:20

## 2023-08-22 RX ADMIN — LABETALOL HYDROCHLORIDE 2.5 MG: 5 INJECTION, SOLUTION INTRAVENOUS at 08:35

## 2023-08-22 RX ADMIN — FAMOTIDINE 20 MG: 10 INJECTION INTRAVENOUS at 06:37

## 2023-08-22 RX ADMIN — OXYCODONE HYDROCHLORIDE AND ACETAMINOPHEN 1 TABLET: 5; 325 TABLET ORAL at 21:03

## 2023-08-22 RX ADMIN — PROPOFOL 150 MG: 10 INJECTION, EMULSION INTRAVENOUS at 07:33

## 2023-08-22 RX ADMIN — NITROGLYCERIN 1 INCH: 20 OINTMENT TOPICAL at 17:57

## 2023-08-22 RX ADMIN — SODIUM CHLORIDE 900 MG: 9 INJECTION, SOLUTION INTRAVENOUS at 15:21

## 2023-08-22 RX ADMIN — CYCLOBENZAPRINE 10 MG: 10 TABLET, FILM COATED ORAL at 06:38

## 2023-08-22 RX ADMIN — POLYETHYLENE GLYCOL 3350 17 G: 17 POWDER, FOR SOLUTION ORAL at 13:54

## 2023-08-22 RX ADMIN — SODIUM CHLORIDE 250 MG: 9 INJECTION, SOLUTION INTRAVENOUS at 06:30

## 2023-08-22 RX ADMIN — PROPOFOL 100 MG: 10 INJECTION, EMULSION INTRAVENOUS at 07:36

## 2023-08-22 RX ADMIN — PHENYLEPHRINE HYDROCHLORIDE 50 MCG: 10 INJECTION INTRAVENOUS at 09:35

## 2023-08-22 RX ADMIN — ROCURONIUM BROMIDE 50 MG: 10 INJECTION, SOLUTION INTRAVENOUS at 07:34

## 2023-08-22 RX ADMIN — SODIUM CHLORIDE, POTASSIUM CHLORIDE, SODIUM LACTATE AND CALCIUM CHLORIDE: 600; 310; 30; 20 INJECTION, SOLUTION INTRAVENOUS at 09:11

## 2023-08-22 RX ADMIN — CELECOXIB 200 MG: 200 CAPSULE ORAL at 11:32

## 2023-08-22 RX ADMIN — FENTANYL CITRATE 50 MCG: 50 INJECTION, SOLUTION INTRAMUSCULAR; INTRAVENOUS at 08:04

## 2023-08-22 RX ADMIN — NITROGLYCERIN 1 INCH: 20 OINTMENT TOPICAL at 13:54

## 2023-08-22 RX ADMIN — HYDROXYZINE PAMOATE 50 MG: 50 CAPSULE ORAL at 06:37

## 2023-08-22 RX ADMIN — LIDOCAINE HYDROCHLORIDE 60 MG: 20 INJECTION, SOLUTION INFILTRATION; PERINEURAL at 07:33

## 2023-08-22 RX ADMIN — SODIUM CHLORIDE, POTASSIUM CHLORIDE, SODIUM LACTATE AND CALCIUM CHLORIDE 9 ML/HR: 600; 310; 30; 20 INJECTION, SOLUTION INTRAVENOUS at 07:05

## 2023-08-22 RX ADMIN — SUGAMMADEX 130 MG: 100 INJECTION, SOLUTION INTRAVENOUS at 10:25

## 2023-08-22 RX ADMIN — SCOPALAMINE 1 PATCH: 1 PATCH, EXTENDED RELEASE TRANSDERMAL at 06:37

## 2023-08-22 RX ADMIN — SODIUM CHLORIDE, POTASSIUM CHLORIDE, SODIUM LACTATE AND CALCIUM CHLORIDE 100 ML/HR: 600; 310; 30; 20 INJECTION, SOLUTION INTRAVENOUS at 11:53

## 2023-08-22 RX ADMIN — MUPIROCIN 1 APPLICATION: 20 OINTMENT TOPICAL at 15:21

## 2023-08-22 RX ADMIN — CELECOXIB 400 MG: 200 CAPSULE ORAL at 06:38

## 2023-08-22 RX ADMIN — SODIUM CHLORIDE, POTASSIUM CHLORIDE, SODIUM LACTATE AND CALCIUM CHLORIDE: 600; 310; 30; 20 INJECTION, SOLUTION INTRAVENOUS at 10:43

## 2023-08-22 RX ADMIN — MIDAZOLAM 1 MG: 1 INJECTION INTRAMUSCULAR; INTRAVENOUS at 07:19

## 2023-08-22 RX ADMIN — DOCUSATE SODIUM 100 MG: 100 CAPSULE, LIQUID FILLED ORAL at 21:03

## 2023-08-22 RX ADMIN — SODIUM CHLORIDE 900 MG: 9 INJECTION, SOLUTION INTRAVENOUS at 23:38

## 2023-08-22 RX ADMIN — NITROGLYCERIN 1 INCH: 20 OINTMENT TOPICAL at 23:38

## 2023-08-22 RX ADMIN — MIDAZOLAM 1 MG: 1 INJECTION INTRAMUSCULAR; INTRAVENOUS at 07:10

## 2023-08-22 NOTE — ANESTHESIA PREPROCEDURE EVALUATION
Anesthesia Evaluation     Patient summary reviewed and Nursing notes reviewed   no history of anesthetic complications:   NPO Solid Status: > 8 hours  NPO Liquid Status: > 2 hours           Airway   Mallampati: II  TM distance: >3 FB  Neck ROM: full  no difficulty expected  Dental - normal exam     Pulmonary - negative pulmonary ROS and normal exam   (-) COPD, asthma, not a smoker, lung cancer  Cardiovascular - negative cardio ROS and normal exam  Exercise tolerance: excellent (>7 METS)    ECG reviewed  Rhythm: regular  Rate: normal    (-) hypertension, valvular problems/murmurs, past MI, CAD, dysrhythmias, angina, CHF, orthopnea, cardiac stents, pericardial effusion      Neuro/Psych- negative ROS  (-) seizures, TIA, CVA  GI/Hepatic/Renal/Endo - negative ROS   (-) hiatal hernia, GERD, PUD, hepatitis, liver disease, no renal disease, diabetes, GI bleed, no thyroid disorder    Musculoskeletal (-) negative ROS    Abdominal  - normal exam   Substance History - negative use     OB/GYN          Other - negative ROS                     Anesthesia Plan    ASA 1     general     intravenous induction     Anesthetic plan, risks, benefits, and alternatives have been provided, discussed and informed consent has been obtained with: patient.    CODE STATUS:

## 2023-08-22 NOTE — ANESTHESIA POSTPROCEDURE EVALUATION
"Patient: Ceci Nolasco    Procedure Summary       Date: 08/22/23 Room / Location: [facility] OR [address] [place] [address] MAIN OR    Anesthesia Start: 0725 Anesthesia Stop: 1057    Procedure: ABDOMINOPLASTY WITH SUCTION (Abdomen) Diagnosis:     Surgeons: JULITO Davis MD Provider: Delgado Cameron MD    Anesthesia Type: general ASA Status: 1            Anesthesia Type: general    Vitals  Vitals Value Taken Time   BP 92/53 08/22/23 1245   Temp 36.7 øC (98 øF) 08/22/23 1230   Pulse 69 08/22/23 1250   Resp 16 08/22/23 1230   SpO2 99 % 08/22/23 1250   Vitals shown include unvalidated device data.        Post Anesthesia Care and Evaluation    Patient location during evaluation: bedside  Patient participation: complete - patient participated  Level of consciousness: awake and alert  Pain score: 0  Pain management: adequate    Airway patency: patent  Anesthetic complications: No anesthetic complications    Cardiovascular status: acceptable  Respiratory status: acceptable  Hydration status: acceptable    Comments: BP (!) 83/45 (BP Location: Left arm, Patient Position: Lying)   Pulse 80   Temp 36.4 øC (97.6 øF) (Oral)   Resp 16   Ht 152.4 cm (60\")   Wt 66 kg (145 lb 8.1 oz)   SpO2 97%   BMI 28.42 kg/mý     "

## 2023-08-22 NOTE — OP NOTE
Preoperative diagnosis: Abdominal lipodystrophy  Postoperative diagnosis: Same  Procedure: Abdominoplasty with muscular fascial plication and suction assisted lipectomy lateral  Surgeon: Fabien Davis MD  Anesthesia: General endotracheal  Estimated blood loss 40 cc  Drains x2  Complications none apparent  Indications for procedure: This patient's had 3 previous  sections that are quite low she desires abdominoplasty she has a lot of abdominal laxity as well we discussed that she very well may have a small vertical incision because of the position of her umbilicus relative to the position of her very low  scar she understands and accepts that she is marked prior to surgery and I showed her where the incisions would be.  She is reviewed the informed consent my office for abdominoplasty and agrees to proceed.  Procedure: Patient brought the operating room placed operatively supine position.  Satisfactory general endotracheal anesthesia achieved without difficulty.  We injected dilute local anesthesia about the lateral aspects of her proposed abdominoplasty incision into the lateral hip where we proposed the suction we also injected the incision lines we allowed this for several minutes while she was prepped and draped in a sterile fashion we made small incisions within our planned excision for her abdominoplasty and then using a 4 mm 3-hole cannula suction approximately 200 cc total from this lateral area on each side.  We then made our lower incision and dissected in the abdominoplasty flap skin sparing the umbilicus on a well vascularized stalk and dissected until we reached the epigastrium near the xiphoid.  She had an obvious diastases this was reapproximated with oh loop PDS from just above the pubis to the epigastric area we went around the umbilicus and then using interrupted #1 PDS we centralized the umbilicus in the periumbilical area hemostasis was excellent being maintained with the  Bovie cauterization we injected 30 cc of ropivacaine for postoperative analgesia into the fascia of the abdominal wall superficially we then also as we closed placed in progressive tension sutures of 2-0 Vicryl after irrigating with antibiotic containing saline and placing 215 [place] Krzysztof drains through separate incisions and securing them with silk sutures as we closed irrigated with antibiotic containing saline then we sprayed the operative sites with TA acid with that in a flexed position the incisions came together nicely after resecting the overhanging abdominoplasty flap it was clear that the umbilical take-out site but have to be closed vertically as predicted.  We completed the closure of the abdominoplasty with multiple 2-0 Vicryl's in the superficial fascia 4-0 Vicryl's in the deep dermis and then 4-0 PDS subcuticular the umbilicus was closed with 4-0 Vicryl in a running 5-0 Prolene ointment was applied to the umbilicus with a Xeroform gauze a few staples were placed to reinforce her abdominal closure as a preventative measure we placed nitroglycerin paste at the T juncture of the small vertical component centrally Exofin skin adhesive was also applied to the incisions and allowed to dry multiple ABD pads were placed particularly over the suctioned areas and a light abdominal binder drains were covered with 2 x 2's and tape she tolerated procedure well needle and sponge counts were correct x2 hemostasis was excellent at the time of closure.  And she was carefully transferred from the operating table to her bed in a flexed position.  The resected skin and subcutaneous fat from the abdominoplasty resection weighed 1036 g

## 2023-08-22 NOTE — ANESTHESIA PROCEDURE NOTES
Airway  Urgency: elective    Date/Time: 8/22/2023 7:39 AM  Difficult airway    General Information and Staff    Patient location during procedure: OR  Anesthesiologist: Delgado Cameron MD  CRNA/CAA: Luba Minaya CRNA    Indications and Patient Condition  Indications for airway management: airway protection    Preoxygenated: yes  MILS maintained throughout  Mask difficulty assessment: 1 - vent by mask    Final Airway Details  Final airway type: endotracheal airway      Successful airway: ETT  Cuffed: yes   Successful intubation technique: direct laryngoscopy  Facilitating devices/methods: anterior pressure/BURP and Bougie  Endotracheal tube insertion site: oral  Blade: Luis  Blade size: 3  ETT size (mm): 7.0  Cormack-Lehane Classification: grade IIa - partial view of glottis  Placement verified by: chest auscultation and capnometry   Cuff volume (mL): 4  Measured from: lips  ETT/EBT  to lips (cm): 20  Number of attempts at approach: 1  Assessment: lips, teeth, and gum same as pre-op and atraumatic intubation    Additional Comments  ANTERIOR, VERY SMALL MOUTH OPENING, MAY BE RELATED TO MUSCLE RELAXANT. VERY EASY MASK. Atraumatic ET Tube placement.  Teeth as pre-op. BLEBS.  -ABD sounds.  +ET CO2.  Secured to face

## 2023-08-22 NOTE — PLAN OF CARE
Goal Outcome Evaluation:  Plan of Care Reviewed With: patient        Progress: improving  Outcome Evaluation: From PACU this afternoon. Abdominal binder in place - incisions intact. ANT x2 with serousang output. FC with good output. IVFs infusing. IV abx given. Nitro paste administered per orders. Incentive spirometer at bedside - up to 2500. Pain well controlled with percocet. BP low, otherwise VSS on 2 L O2. SCDs on. Sips with meds. Daughter at bedside.

## 2023-08-23 RX ADMIN — OXYCODONE HYDROCHLORIDE AND ACETAMINOPHEN 1 TABLET: 5; 325 TABLET ORAL at 15:10

## 2023-08-23 RX ADMIN — SODIUM CHLORIDE, POTASSIUM CHLORIDE, SODIUM LACTATE AND CALCIUM CHLORIDE 50 ML/HR: 600; 310; 30; 20 INJECTION, SOLUTION INTRAVENOUS at 13:23

## 2023-08-23 RX ADMIN — OXYCODONE HYDROCHLORIDE AND ACETAMINOPHEN 1 TABLET: 5; 325 TABLET ORAL at 10:43

## 2023-08-23 RX ADMIN — NITROGLYCERIN 1 INCH: 20 OINTMENT TOPICAL at 06:49

## 2023-08-23 RX ADMIN — MUPIROCIN 1 APPLICATION: 20 OINTMENT TOPICAL at 08:07

## 2023-08-23 RX ADMIN — POLYETHYLENE GLYCOL 3350 17 G: 17 POWDER, FOR SOLUTION ORAL at 08:07

## 2023-08-23 RX ADMIN — OXYCODONE HYDROCHLORIDE AND ACETAMINOPHEN 1 TABLET: 5; 325 TABLET ORAL at 06:47

## 2023-08-23 RX ADMIN — MUPIROCIN 1 APPLICATION: 20 OINTMENT TOPICAL at 20:19

## 2023-08-23 RX ADMIN — DOCUSATE SODIUM 100 MG: 100 CAPSULE, LIQUID FILLED ORAL at 20:19

## 2023-08-23 RX ADMIN — METHOCARBAMOL TABLETS 750 MG: 750 TABLET, COATED ORAL at 08:13

## 2023-08-23 RX ADMIN — OXYCODONE HYDROCHLORIDE AND ACETAMINOPHEN 1 TABLET: 5; 325 TABLET ORAL at 20:19

## 2023-08-23 RX ADMIN — METHOCARBAMOL TABLETS 750 MG: 750 TABLET, COATED ORAL at 16:19

## 2023-08-23 RX ADMIN — DOCUSATE SODIUM 100 MG: 100 CAPSULE, LIQUID FILLED ORAL at 08:07

## 2023-08-23 RX ADMIN — CELECOXIB 400 MG: 200 CAPSULE ORAL at 08:07

## 2023-08-23 RX ADMIN — BISACODYL 10 MG: 10 SUPPOSITORY RECTAL at 08:03

## 2023-08-23 NOTE — PLAN OF CARE
Goal Outcome Evaluation:  Plan of Care Reviewed With: patient        Progress: improving  Outcome Evaluation: Incisions intact - ABD binder in place. ANT x2 with small amount of serousang output. SCDs on. Up in chair all shift - ambulated in room with staff assist. Voiding without difficulty. Passing flatus and had 2 small BMs after suppository. Encouraged frequent use of incentive spirometer. Daughter at bedside. BP still on the lower side. Dr. Davis allowing patient to have GI soft diet for dinner - she is to only pick at things. Plan is to discharge home tomorrow morning.

## 2023-08-23 NOTE — CASE MANAGEMENT/SOCIAL WORK
Discharge Planning Assessment  [facility]     Patient Name: Ceci Nolasco  MRN: [id number]  Today's Date: 8/23/2023    Admit Date: 8/22/2023    Plan: Home with family   Discharge Needs Assessment       Row Name 08/23/23 1046       Living Environment    People in Home child(diane), dependent;spouse    Current Living Arrangements home    Primary Care Provided by self    Provides Primary Care For child(diane)    Family Caregiver if Needed spouse    Family Caregiver Names Joe [phone]    Able to Return to Prior Arrangements yes       Resource/Environmental Concerns    Resource/Environmental Concerns none       Transition Planning    Patient/Family Anticipates Transition to home with family    Patient/Family Anticipated Services at Transition none    Transportation Anticipated family or friend will provide       Discharge Needs Assessment    Readmission Within the Last 30 Days no previous admission in last 30 days    Equipment Currently Used at Home none    Concerns to be Addressed denies needs/concerns at this time    Anticipated Changes Related to Illness none    Equipment Needed After Discharge none                   Discharge Plan       Row Name 08/23/23 1042       Plan    Plan Home with family    Patient/Family in Agreement with Plan yes    Plan Comments Met with patient at bedside, introduced self and explained CCP role. Verified facesheet. Patient stated she does not have a PCP at this time. CCP provided [facility] PCP info per her request. Patient lives at home with spouse-Joe [phone] and 3 dependent children. Spouse is caring for children while patient is in the hospital. Patient has used [facility] in the past and denies h/o SNF. Patient has no DME. Patient uses [facility] on [facility] and reports no difficulty affording medications. Family will transport at DC. Patient anticipates no DC needs at this time. CCP to follow. Valeriy BERG RN                  Continued Care and Services - Admitted Since 8/22/2023     Coordination has not been started for this encounter.       Expected Discharge Date and Time       Expected Discharge Date Expected Discharge Time    Aug 25, 2023            Demographic Summary       Row Name 08/23/23 1046       General Information    Admission Type inpatient    Referral Source admission list    Reason for Consult discharge planning    Preferred Language English                   Functional Status       Row Name 08/23/23 1046       Functional Status    Usual Activity Tolerance good    Current Activity Tolerance good       Functional Status, IADL    Medications independent    Meal Preparation independent    Housekeeping independent    Laundry independent    Shopping independent       Mental Status    General Appearance WDL WDL       Mental Status Summary    Recent Changes in Mental Status/Cognitive Functioning no changes                   Psychosocial    No documentation.                  Abuse/Neglect    No documentation.                  Legal    No documentation.                  Substance Abuse    No documentation.                  Patient Forms    No documentation.                     Valeriy Prince RN

## 2023-08-23 NOTE — PROGRESS NOTES
Assessment & Plan doing well discussed importance of scds with pt. And nursing staff, d/c degroot oob ambulate in room    Subjective minimal pain    Temp:  [97.1 øF (36.2 øC)-98.5 øF (36.9 øC)] 97.2 øF (36.2 øC)  Heart Rate:  [71-89] 71  Resp:  [10-16] 16  BP: ()/(44-78) 93/57  I/O last 3 completed shifts:  In: 2000 [I.V.:2000]  Out: 2415 [Urine:2300; Drains:115]  No intake/output data recorded.    Objective op sites look good calves soft and non tender      Abdominal panniculus

## 2023-08-24 VITALS
HEIGHT: 60 IN | SYSTOLIC BLOOD PRESSURE: 99 MMHG | BODY MASS INDEX: 28.57 KG/M2 | TEMPERATURE: 97.9 F | OXYGEN SATURATION: 96 % | WEIGHT: 145.5 LBS | RESPIRATION RATE: 16 BRPM | DIASTOLIC BLOOD PRESSURE: 55 MMHG | HEART RATE: 75 BPM

## 2023-08-24 PROBLEM — E65 ABDOMINAL PANNICULUS: Status: RESOLVED | Noted: 2023-08-22 | Resolved: 2023-08-24

## 2023-08-24 RX ORDER — PROMETHAZINE HYDROCHLORIDE 12.5 MG/1
12.5 TABLET ORAL EVERY 4 HOURS PRN
Qty: 20 TABLET | Refills: 0 | Status: SHIPPED | OUTPATIENT
Start: 2023-08-24

## 2023-08-24 RX ORDER — METHOCARBAMOL 750 MG/1
750 TABLET, FILM COATED ORAL EVERY 8 HOURS PRN
Qty: 35 TABLET | Refills: 0 | Status: SHIPPED | OUTPATIENT
Start: 2023-08-24

## 2023-08-24 RX ORDER — OXYCODONE AND ACETAMINOPHEN 10; 325 MG/1; MG/1
.5-1 TABLET ORAL EVERY 6 HOURS PRN
Qty: 20 TABLET | Refills: 0 | Status: SHIPPED | OUTPATIENT
Start: 2023-08-24

## 2023-08-24 RX ADMIN — MUPIROCIN 1 APPLICATION: 20 OINTMENT TOPICAL at 08:49

## 2023-08-24 RX ADMIN — OXYCODONE HYDROCHLORIDE AND ACETAMINOPHEN 1 TABLET: 5; 325 TABLET ORAL at 00:05

## 2023-08-24 RX ADMIN — METHOCARBAMOL TABLETS 750 MG: 750 TABLET, COATED ORAL at 00:05

## 2023-08-24 RX ADMIN — BISACODYL 10 MG: 10 SUPPOSITORY RECTAL at 08:48

## 2023-08-24 RX ADMIN — OXYCODONE HYDROCHLORIDE AND ACETAMINOPHEN 1 TABLET: 5; 325 TABLET ORAL at 04:57

## 2023-08-24 RX ADMIN — OXYCODONE HYDROCHLORIDE AND ACETAMINOPHEN 1 TABLET: 10; 325 TABLET ORAL at 08:48

## 2023-08-24 RX ADMIN — Medication: at 11:30

## 2023-08-24 RX ADMIN — OXYCODONE HYDROCHLORIDE AND ACETAMINOPHEN 1 TABLET: 10; 325 TABLET ORAL at 14:02

## 2023-08-24 RX ADMIN — CELECOXIB 400 MG: 200 CAPSULE ORAL at 08:48

## 2023-08-24 NOTE — PLAN OF CARE
Goal Outcome Evaluation:  Plan of Care Reviewed With: patient        Progress: improving  Outcome Evaluation: vss, c/o pain medicated with norco and robaxin, up with assist, bed flexed at all times, tolerating sips of water, encourage to use incentive spirometer, ambulating inside the room only, low transverse incision with staples with gauze and abdominal binder, 2 amberly's with serous drainage, continue to monitor the pt.

## 2023-08-24 NOTE — DISCHARGE SUMMARY
Assessment & Plan patient is doing very well postoperative day #2 from abdominoplasty with muscular fascial plication and suction assisted lipectomy by Fabien Davis MD on 8/22/2023 she is for a shower this morning and teaching of drain care and she is to be discharged after this I reviewed my postop care following abdominoplasty instructions with the patient she will receive 2 copies the nurses will review this as well with her if this is scanned into epic as well her diet is a light and healthy diet with small amounts until she is tolerating this well and having good bowel function.  She is to rest she is to stay in a flexed position in bed she is to walk in a slightly flexed position when up she may shower she will receive a shower before she goes today her follow-up appointment will be Monday with Dr. Davis she has my contact information if she has questions or concerns her medications will include Percocet Robaxin and Phenergan I encouraged her to use as little as possible she is also use over-the-counter probiotics 2-3 times a day stool softeners and laxatives as needed to avoid constipation and she understands to take as little of the pain medicine as possible to help with possible constipation she may walk in the house 5 pound weight limit for several weeks no driving for 3 weeks.  She is stable at discharge all operative sites look good she is doing well    Subjective postoperative day #2 from abdominoplasty with muscular fascial plication and suction assisted lipectomy by Fabien Davis MD on 8/22/2023 she is tolerating oral intake in small amounts she is tolerating oral pain control she has had a small bowel movement and passed some flatus yesterday and has rumbling in her stomach this am she is voiding without difficulties pain is reasonably controlled    Temp:  [97.3 øF (36.3 øC)-97.9 øF (36.6 øC)] 97.9 øF (36.6 øC)  Heart Rate:  [65-86] 75  Resp:  [16] 16  BP: (78-96)/(48-65) 83/56  I/O last  3 completed shifts:  In: 720 [P.O.:720]  Out: 3382 [Urine:3150; Drains:232]  No intake/output data recorded.    Objective operative sites look good incisions intact no undue tension all skin edges appear healthy and viable there is one small blister on her right hip we will treat this with Desitin ointment calves are soft and nontender no swelling patient is moving reasonably well drains are appropriate and clear      * No active hospital problems. *

## 2023-08-24 NOTE — PROGRESS NOTES
Case Management Discharge Note      Final Note: Discharged home. Jenny Barrera RN         Selected Continued Care - Discharged on 8/24/2023 Admission date: 8/22/2023 - Discharge disposition: Home or Self Care         Transportation Services  Private: Car    Final Discharge Disposition Code: 01 - home or self-care

## 2023-09-29 ENCOUNTER — APPOINTMENT (OUTPATIENT)
Dept: WOMENS IMAGING | Facility: HOSPITAL | Age: 43
End: 2023-09-29
Payer: COMMERCIAL

## 2023-09-29 PROCEDURE — 76642 ULTRASOUND BREAST LIMITED: CPT | Performed by: RADIOLOGY

## 2024-04-05 ENCOUNTER — APPOINTMENT (OUTPATIENT)
Dept: WOMENS IMAGING | Facility: HOSPITAL | Age: 44
End: 2024-04-05
Payer: COMMERCIAL

## 2024-04-05 PROCEDURE — G0279 TOMOSYNTHESIS, MAMMO: HCPCS | Performed by: RADIOLOGY

## 2024-04-05 PROCEDURE — 76642 ULTRASOUND BREAST LIMITED: CPT | Performed by: RADIOLOGY

## 2024-04-05 PROCEDURE — 77066 DX MAMMO INCL CAD BI: CPT | Performed by: RADIOLOGY

## 2024-04-05 PROCEDURE — 77062 BREAST TOMOSYNTHESIS BI: CPT | Performed by: RADIOLOGY

## 2025-05-02 ENCOUNTER — APPOINTMENT (OUTPATIENT)
Dept: WOMENS IMAGING | Facility: HOSPITAL | Age: 45
End: 2025-05-02
Payer: COMMERCIAL

## 2025-05-02 PROCEDURE — 77063 BREAST TOMOSYNTHESIS BI: CPT | Performed by: RADIOLOGY

## 2025-05-02 PROCEDURE — 77067 SCR MAMMO BI INCL CAD: CPT | Performed by: RADIOLOGY

## (undated) DEVICE — Device: Brand: FABCO

## (undated) DEVICE — Device

## (undated) DEVICE — PK UNIV COMPL 40

## (undated) DEVICE — DRSNG GZ PETROLTM XEROFORM CURAD 1X8IN STRL

## (undated) DEVICE — JACKSON-PRATT 100CC BULB RESERVOIR: Brand: CARDINAL HEALTH

## (undated) DEVICE — NEEDLE, QUINCKE, 20GX3.5": Brand: MEDLINE

## (undated) DEVICE — ELECTRD BLD EZ CLN MOD XLNG 2.75IN

## (undated) DEVICE — ANTIBACTERIAL UNDYED BRAIDED (POLYGLACTIN 910), SYNTHETIC ABSORBABLE SUTURE: Brand: COATED VICRYL

## (undated) DEVICE — STPLR SKIN VISISTAT WD 35CT

## (undated) DEVICE — BNDR ABD 4PANEL 12IN 46 TO 62IN

## (undated) DEVICE — BIFURCATED DRAIN EXTENSION FOR CLOSED WOUND DRAIN: Brand: AXIOM®

## (undated) DEVICE — TBG PENCL TELESCP MEGADYNE SMOKE EVAC 10FT

## (undated) DEVICE — SPK PIN NSR FLUID NONVNT 2WY MINI LL LF

## (undated) DEVICE — TBG SXN LIPO 3/8IDX5/8IN ODX10FT DISP

## (undated) DEVICE — TRAP FLD MINIVAC MEGADYNE 100ML

## (undated) DEVICE — SYS ENSING FLUID M/ ADD MAC1001

## (undated) DEVICE — TOTAL TRAY, 16FR 10ML SIL FOLEY, URN: Brand: MEDLINE

## (undated) DEVICE — SPNG LAP 18X18IN LF STRL PK/5

## (undated) DEVICE — INTENDED FOR TISSUE SEPARATION, AND OTHER PROCEDURES THAT REQUIRE A SHARP SURGICAL BLADE TO PUNCTURE OR CUT.: Brand: BARD-PARKER ® CARBON RIB-BACK BLADES

## (undated) DEVICE — INTENDED FOR TISSUE SEPARATION, AND OTHER PROCEDURES THAT REQUIRE A SHARP SURGICAL BLADE TO PUNCTURE OR CUT.: Brand: BARD-PARKER ® STAINLESS STEEL BLADES

## (undated) DEVICE — SKIN PREP TRAY W/CHG: Brand: MEDLINE INDUSTRIES, INC.

## (undated) DEVICE — SUT SILK 2/0 FS BLK 18IN 685G

## (undated) DEVICE — PAD,ABDOMINAL,8"X10",ST,LF: Brand: MEDLINE

## (undated) DEVICE — GLV SURG PREMIERPRO ORTHO LTX PF SZ8.5 BRN

## (undated) DEVICE — PATIENT RETURN ELECTRODE, SINGLE-USE, CONTACT QUALITY MONITORING, ADULT, WITH 9FT CORD, FOR PATIENTS WEIGING OVER 33LBS. (15KG): Brand: MEGADYNE

## (undated) DEVICE — DRSNG PAD ABD 8X10IN STRL